# Patient Record
Sex: MALE | Race: WHITE
[De-identification: names, ages, dates, MRNs, and addresses within clinical notes are randomized per-mention and may not be internally consistent; named-entity substitution may affect disease eponyms.]

---

## 2020-06-14 ENCOUNTER — HOSPITAL ENCOUNTER (EMERGENCY)
Dept: HOSPITAL 47 - EC | Age: 20
Discharge: HOME | End: 2020-06-14
Payer: COMMERCIAL

## 2020-06-14 VITALS
TEMPERATURE: 99.6 F | DIASTOLIC BLOOD PRESSURE: 71 MMHG | SYSTOLIC BLOOD PRESSURE: 120 MMHG | HEART RATE: 85 BPM | RESPIRATION RATE: 18 BRPM

## 2020-06-14 DIAGNOSIS — S93.401A: Primary | ICD-10-CM
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
## 2020-06-14 PROCEDURE — 29515 APPLICATION SHORT LEG SPLINT: CPT

## 2020-06-14 PROCEDURE — 99283 EMERGENCY DEPT VISIT LOW MDM: CPT

## 2020-06-14 NOTE — XR
EXAMINATION TYPE: XR ankle complete RT

 

DATE OF EXAM: 6/14/2020

 

COMPARISON: NONE

 

HISTORY: Ankle pain

 

TECHNIQUE: 3 views

 

FINDINGS: Ankle mortise is anatomic. I see no fracture nor dislocation. There is mild soft tissue swe
lling over the lateral malleolus.

 

IMPRESSION: Mild soft tissue swelling. No fracture seen.

## 2020-06-14 NOTE — ED
Lower Extremity Injury HPI





- General


Source: patient


Mode of arrival: wheelchair


Limitations: no limitations





<Gordo Florian - Last Filed: 06/14/20 20:43>





<Dottie Chawla - Last Filed: 06/22/20 13:16>





- General


Chief Complaint: Extremity Injury, Lower


Stated Complaint: R ankle injury


Time Seen by Provider: 06/14/20 20:18





- History of Present Illness


Initial Comments: 





Patient is a 19-year-old male presenting to emergency Department with a chief 

complaint of right ankle pain.  Patient reports he was playing basketball when 

he sobered an inversion injury to the right ankle.  Patient reports he felt a 

"pop" followed by sudden onset of pain.  Patient reports limited range of motion

with inversion and eversion.  Most of the pain is located around the original 

swelling which is located on the lateral aspect of the ankle.  Lateral 

malleolus.  Patient denies any erythema or ecchymotic regions.  Patient reports 

taking 400 mg of ibuprofen prior to arrival.  Denies any numbness or tingling.  

States pain is exacerbated with weightbearing.  This occurred about one hour 

prior to arrival. (Gordo Florian)





- Related Data


                                    Allergies











Allergy/AdvReac Type Severity Reaction Status Date / Time


 


No Known Allergies Allergy   Verified 06/14/20 20:15














Review of Systems


ROS Other: All systems not noted in ROS Statement are negative.





<Gordo Florian - Last Filed: 06/14/20 20:43>


ROS Other: All systems not noted in ROS Statement are negative.





<Dottie Chawla - Last Filed: 06/22/20 13:16>


ROS Statement: 


Those systems with pertinent positive or pertinent negative responses have been 

documented in the HPI.








Past Medical History


Past Medical History: No Reported History


History of Any Multi-Drug Resistant Organisms: None Reported


Past Surgical History: No Surgical Hx Reported


Past Psychological History: No Psychological Hx Reported


Smoking Status: Never smoker


Past Alcohol Use History: None Reported


Past Drug Use History: None Reported





<Gordo Florian - Last Filed: 06/14/20 20:43>





General Exam


Limitations: no limitations


General appearance: alert, in no apparent distress


Head exam: Present: atraumatic, normocephalic, normal inspection


Eye exam: Present: normal appearance, PERRL, EOMI


Pupils: Present: normal accommodation


ENT exam: Present: normal exam, normal oropharynx, mucous membranes moist


Neck exam: Present: normal inspection, full ROM


Respiratory exam: Present: normal lung sounds bilaterally.  Absent: respiratory 

distress, wheezes, rales


Cardiovascular Exam: Present: regular rate, normal rhythm, normal heart sounds


Extremities exam: Present: tenderness (Tenderness along the region of swelling),

normal capillary refill, joint swelling (Right ankle), other (+2 dorsalis pedis 

and posterior tibialis bilaterally).  Absent: normal inspection (Swelling noted 

on the lateral malleolus of the right ankle), full ROM (Limited range of motion 

with inversion and eversion)


Back exam: Present: normal inspection, full ROM.  Absent: tenderness


Neurological exam: Present: alert, oriented X3


Psychiatric exam: Present: normal affect, normal mood


Skin exam: Present: warm, dry, intact, normal color





<Gordo Florian - Last Filed: 06/14/20 20:43>





Course





                                   Vital Signs











  06/14/20





  20:12


 


Temperature 99.6 F


 


Pulse Rate 85


 


Respiratory 18





Rate 


 


Blood Pressure 120/71


 


O2 Sat by Pulse 100





Oximetry 














Procedures





- Orthopedic Splinting/Casting


  ** Injury #1


Side: right


Lower Extremity Injury Location: ankle


Lower Extremity Immobilizer: stirrup splint, AirCast, Ace wrap





<Gordo Florian - Last Filed: 06/14/20 20:43>





Medical Decision Making





<Gordo Florian - Last Filed: 06/14/20 20:43>





<Dottie Chawla - Last Filed: 06/22/20 13:16>





- Medical Decision Making





Patient is a 19-year-old male presenting to the emergency department chief 

complaint of right ankle pain.  Exam reveals swelling along the lateral 

malleolus of the right ankle.  X-ray reveals soft tissue swelling but no 

fracture dislocations.  Ankle stirrup splint was applied.  Patient advised to 

follow with orthopedic specialist if symptoms not improved.  Patient advised 

about ice compress and to alternate between Tylenol Motrin for pain control.  

Strict return parameters were thoroughly discussed with patient was 

understanding and agreeable.  Advised to follow-up with orthopedic specialist.  

Case discussed with physician. (Gordo Florian)


I was available for consultation in the emergency department.  The history and 

physical exam were done by the midlevel provider. I was consulted for this 

patients care. I reviewed the case with the midlevel provider and based on 

their presentation of the patient, I agree with the assessment, medical decision

making and plan of care as documented. 


Chart was dictated using Dragon dictation software.  Attempts were made to 

correct any dictation errors however some typographical errors may persist. 


Patient was seen during a national state of emergency due to the Covid-19 

pandemic. 


 (Dottie Chawla)





Disposition


Is patient prescribed a controlled substance at d/c from ED?: No


Time of Disposition: 20:47





<Gordo Florian - Last Filed: 06/14/20 20:43>





<Dottie Chawal - Last Filed: 06/22/20 13:16>


Clinical Impression: 


 Right ankle sprain, Pain and swelling of right ankle





Disposition: HOME SELF-CARE


Condition: Stable


Instructions (If sedation given, give patient instructions):  Ankle Sprain (ED)


Additional Instructions: 


Follow-up with orthopedic specialist.  Alternate between Tylenol and Motrin for 

pain control.  Apply ice compress to keep the foot elevated and to alleviate 

symptoms.  Return to emergency department if symptoms worsen.


Referrals: 


Nonstaff,Physician [REFERRING] - 1-2 days


Madi Lopez MD [STAFF PHYSICIAN] - 1-2 days